# Patient Record
Sex: MALE | Race: WHITE | NOT HISPANIC OR LATINO | URBAN - METROPOLITAN AREA
[De-identification: names, ages, dates, MRNs, and addresses within clinical notes are randomized per-mention and may not be internally consistent; named-entity substitution may affect disease eponyms.]

---

## 2017-08-11 ENCOUNTER — OFFICE (OUTPATIENT)
Dept: URBAN - METROPOLITAN AREA CLINIC 101 | Facility: CLINIC | Age: 72
End: 2017-08-11

## 2017-08-11 VITALS
SYSTOLIC BLOOD PRESSURE: 125 MMHG | HEART RATE: 73 BPM | WEIGHT: 215 LBS | DIASTOLIC BLOOD PRESSURE: 81 MMHG | HEIGHT: 73 IN | TEMPERATURE: 97.5 F

## 2017-08-11 DIAGNOSIS — K59.09 OTHER CONSTIPATION: ICD-10-CM

## 2017-08-11 DIAGNOSIS — B34.9 VIRAL INFECTION, UNSPECIFIED: ICD-10-CM

## 2017-08-11 DIAGNOSIS — E10.9 TYPE 1 DIABETES MELLITUS WITHOUT COMPLICATIONS: ICD-10-CM

## 2017-08-11 DIAGNOSIS — R93.5 ABNORMAL FINDINGS ON DIAGNOSTIC IMAGING OF OTHER ABDOMINAL R: ICD-10-CM

## 2017-08-11 DIAGNOSIS — R11.2 NAUSEA WITH VOMITING, UNSPECIFIED: ICD-10-CM

## 2017-08-11 PROCEDURE — 99214 OFFICE O/P EST MOD 30 MIN: CPT

## 2022-03-26 ENCOUNTER — INPATIENT HOSPITAL (OUTPATIENT)
Dept: URBAN - METROPOLITAN AREA HOSPITAL 13 | Facility: HOSPITAL | Age: 77
End: 2022-03-26

## 2022-03-26 DIAGNOSIS — N28.89 OTHER SPECIFIED DISORDERS OF KIDNEY AND URETER: ICD-10-CM

## 2022-03-26 DIAGNOSIS — K31.84 GASTROPARESIS: ICD-10-CM

## 2022-03-26 DIAGNOSIS — R11.2 NAUSEA WITH VOMITING, UNSPECIFIED: ICD-10-CM

## 2022-03-26 DIAGNOSIS — R62.7 ADULT FAILURE TO THRIVE: ICD-10-CM

## 2022-03-26 PROCEDURE — 99222 1ST HOSP IP/OBS MODERATE 55: CPT | Performed by: INTERNAL MEDICINE

## 2022-03-27 PROCEDURE — 99232 SBSQ HOSP IP/OBS MODERATE 35: CPT | Performed by: INTERNAL MEDICINE

## 2022-03-28 ENCOUNTER — INPATIENT HOSPITAL (OUTPATIENT)
Dept: URBAN - METROPOLITAN AREA HOSPITAL 13 | Facility: HOSPITAL | Age: 77
End: 2022-03-28

## 2022-03-28 DIAGNOSIS — R11.2 NAUSEA WITH VOMITING, UNSPECIFIED: ICD-10-CM

## 2022-03-28 DIAGNOSIS — K29.60 OTHER GASTRITIS WITHOUT BLEEDING: ICD-10-CM

## 2022-03-28 DIAGNOSIS — K22.11 ULCER OF ESOPHAGUS WITH BLEEDING: ICD-10-CM

## 2022-03-28 DIAGNOSIS — K31.84 GASTROPARESIS: ICD-10-CM

## 2022-03-28 DIAGNOSIS — R62.7 ADULT FAILURE TO THRIVE: ICD-10-CM

## 2022-03-28 DIAGNOSIS — R13.19 OTHER DYSPHAGIA: ICD-10-CM

## 2022-03-28 PROCEDURE — 99232 SBSQ HOSP IP/OBS MODERATE 35: CPT | Performed by: NURSE PRACTITIONER

## 2022-03-29 ENCOUNTER — INPATIENT HOSPITAL (OUTPATIENT)
Dept: URBAN - METROPOLITAN AREA HOSPITAL 13 | Facility: HOSPITAL | Age: 77
End: 2022-03-29

## 2022-03-29 DIAGNOSIS — K29.60 OTHER GASTRITIS WITHOUT BLEEDING: ICD-10-CM

## 2022-03-29 DIAGNOSIS — R13.19 OTHER DYSPHAGIA: ICD-10-CM

## 2022-03-29 DIAGNOSIS — R62.7 ADULT FAILURE TO THRIVE: ICD-10-CM

## 2022-03-29 DIAGNOSIS — R11.2 NAUSEA WITH VOMITING, UNSPECIFIED: ICD-10-CM

## 2022-03-29 DIAGNOSIS — K22.11 ULCER OF ESOPHAGUS WITH BLEEDING: ICD-10-CM

## 2022-03-29 PROCEDURE — 43239 EGD BIOPSY SINGLE/MULTIPLE: CPT | Performed by: INTERNAL MEDICINE

## 2022-04-01 ENCOUNTER — OFFICE (OUTPATIENT)
Dept: URBAN - METROPOLITAN AREA CLINIC 79 | Facility: CLINIC | Age: 77
End: 2022-04-01

## 2022-04-01 VITALS
HEIGHT: 73 IN | WEIGHT: 230 LBS | SYSTOLIC BLOOD PRESSURE: 123 MMHG | TEMPERATURE: 98.1 F | HEART RATE: 75 BPM | DIASTOLIC BLOOD PRESSURE: 73 MMHG

## 2022-04-01 DIAGNOSIS — E11.9 TYPE 2 DIABETES MELLITUS WITHOUT COMPLICATIONS: ICD-10-CM

## 2022-04-01 DIAGNOSIS — Z85.01 PERSONAL HISTORY OF MALIGNANT NEOPLASM OF ESOPHAGUS: ICD-10-CM

## 2022-04-01 DIAGNOSIS — K20.90 ESOPHAGITIS, UNSPECIFIED WITHOUT BLEEDING: ICD-10-CM

## 2022-04-01 PROCEDURE — 99215 OFFICE O/P EST HI 40 MIN: CPT | Performed by: PHYSICIAN ASSISTANT

## 2022-04-01 RX ORDER — ESOMEPRAZOLE MAGNESIUM 40 MG/1
FOR SUSPENSION ORAL
Qty: 60 | Refills: 11 | Status: ACTIVE

## 2022-04-01 NOTE — SERVICEHPINOTES
76 yo male with h/o pancreatic and esophageal cancer (s/p esophagectomy with gastric pull up and pancreatic tail resection and splenectomy 2014 at [facility]) presents for f/u recent hospitalization for vomiting. He has been having bouts of vomiting intermittently, lasting up to 5 days since 2020 and had gone to the hospital recently due to prolonged symptoms. Started having N/V, belching, fatigue, no appetite on 3/8. Went the ER on 3/15 at [facility] and reports a CT showing no obstructions. He went to [facility] on 3/25 and was admitted through 3/29. He had a GES showing delayed emptying and had an EGD on 3/9 showing tortuous esophagus with severe erythematous, friable and nodular mucosa in the esophagus. Biopsies came back benign, though immunochemistry and special stains are still pending. He had previously been on pantoprazole 40 mg BID for the past year, and continues on this. Was given Carafate and liquid Prevacid to have on hand as well. Was on Reglan which was helping but this was DC'd. He has not been vomiting for the past 3 days. Had not had a BM for over a week but has had several now in the past couple of days. silas rosen He reports h/o type 2 diabetes but as he is insulin dependent he is now considered to have type 1. Reports last HgbA1C in the 7 range but says it has been awhile since he's had that checked. 
silas rosen He reports remote h/o TIAs for which he is on Plavix. Denies heart disease other than mild valve disease. Denies CAD.

## 2022-05-17 ENCOUNTER — ON CAMPUS - OUTPATIENT (OUTPATIENT)
Dept: URBAN - METROPOLITAN AREA HOSPITAL 65 | Facility: HOSPITAL | Age: 77
End: 2022-05-17

## 2022-05-17 DIAGNOSIS — K31.7 POLYP OF STOMACH AND DUODENUM: ICD-10-CM

## 2022-05-17 DIAGNOSIS — K20.80 OTHER ESOPHAGITIS WITHOUT BLEEDING: ICD-10-CM

## 2022-05-17 DIAGNOSIS — T18.2XXA FOREIGN BODY IN STOMACH, INITIAL ENCOUNTER: ICD-10-CM

## 2022-05-17 DIAGNOSIS — K29.60 OTHER GASTRITIS WITHOUT BLEEDING: ICD-10-CM

## 2022-05-17 PROCEDURE — 43239 EGD BIOPSY SINGLE/MULTIPLE: CPT | Performed by: INTERNAL MEDICINE

## 2022-08-31 ENCOUNTER — ON CAMPUS - OUTPATIENT (OUTPATIENT)
Dept: URBAN - METROPOLITAN AREA HOSPITAL 16 | Facility: HOSPITAL | Age: 77
End: 2022-08-31

## 2022-08-31 DIAGNOSIS — Z53.8 PROCEDURE AND TREATMENT NOT CARRIED OUT FOR OTHER REASONS: ICD-10-CM

## 2022-08-31 DIAGNOSIS — K20.80 OTHER ESOPHAGITIS WITHOUT BLEEDING: ICD-10-CM

## 2022-08-31 PROCEDURE — 43235 EGD DIAGNOSTIC BRUSH WASH: CPT | Mod: 53 | Performed by: INTERNAL MEDICINE

## 2022-09-20 ENCOUNTER — ON CAMPUS - OUTPATIENT (OUTPATIENT)
Dept: URBAN - METROPOLITAN AREA HOSPITAL 16 | Facility: HOSPITAL | Age: 77
End: 2022-09-20
Payer: COMMERCIAL

## 2022-09-20 DIAGNOSIS — R11.2 NAUSEA WITH VOMITING, UNSPECIFIED: ICD-10-CM

## 2022-09-20 DIAGNOSIS — K59.00 CONSTIPATION, UNSPECIFIED: ICD-10-CM

## 2022-09-20 DIAGNOSIS — K31.84 GASTROPARESIS: ICD-10-CM

## 2022-09-20 PROCEDURE — 99214 OFFICE O/P EST MOD 30 MIN: CPT | Performed by: INTERNAL MEDICINE

## 2022-09-21 ENCOUNTER — INPATIENT HOSPITAL (OUTPATIENT)
Dept: URBAN - METROPOLITAN AREA HOSPITAL 13 | Facility: HOSPITAL | Age: 77
End: 2022-09-21

## 2022-09-21 DIAGNOSIS — K59.00 CONSTIPATION, UNSPECIFIED: ICD-10-CM

## 2022-09-21 DIAGNOSIS — K31.84 GASTROPARESIS: ICD-10-CM

## 2022-09-21 DIAGNOSIS — R11.2 NAUSEA WITH VOMITING, UNSPECIFIED: ICD-10-CM

## 2022-09-21 PROCEDURE — 99213 OFFICE O/P EST LOW 20 MIN: CPT | Performed by: NURSE PRACTITIONER

## 2023-01-25 ENCOUNTER — OFFICE (OUTPATIENT)
Dept: URBAN - METROPOLITAN AREA CLINIC 79 | Facility: CLINIC | Age: 78
End: 2023-01-25

## 2023-01-25 VITALS
HEART RATE: 71 BPM | DIASTOLIC BLOOD PRESSURE: 81 MMHG | WEIGHT: 251 LBS | TEMPERATURE: 97.3 F | HEIGHT: 73 IN | SYSTOLIC BLOOD PRESSURE: 148 MMHG

## 2023-01-25 DIAGNOSIS — K31.84 GASTROPARESIS: ICD-10-CM

## 2023-01-25 DIAGNOSIS — E11.9 TYPE 2 DIABETES MELLITUS WITHOUT COMPLICATIONS: ICD-10-CM

## 2023-01-25 DIAGNOSIS — Z85.01 PERSONAL HISTORY OF MALIGNANT NEOPLASM OF ESOPHAGUS: ICD-10-CM

## 2023-01-25 DIAGNOSIS — K20.90 ESOPHAGITIS, UNSPECIFIED WITHOUT BLEEDING: ICD-10-CM

## 2023-01-25 PROCEDURE — 99214 OFFICE O/P EST MOD 30 MIN: CPT | Performed by: PHYSICIAN ASSISTANT

## 2023-01-25 NOTE — SERVICEHPINOTES
76 yo male with h/o pancreatic and esophageal cancer (s/p esophagectomy with gastric pull up and pancreatic tail resection and splenectomy 2014 at [facility]) presents for f/u esophagitis. 
silas rosen He was last seen in the office in April for f/u after a hospitalization for vomiting. He had a GES showing delayed emptying and had an EGD on 3/9 showing tortuous esophagus with severe erythematous, friable and nodular mucosa in the esophagus. He had previously been on pantoprazole 40 mg BID for the prior year. 
silas rosenWe switched him to Nexium granules which he has continued. He is on Nexium 40 mg BID. He tried to lower his dosage but had too much reflux. He says his swallowing is normally ok but he is cautious with his diet. He has been taking Reglan 4x/day as he finds this to be helpful. silas rosen He had f/u EGDs in May and August 2022 with improvements each time in his esophagus, but retained food in stomach with recommendation for another EGD after a longer period of liquids/fasting. 
silas rosen He is doing ok overall today. Had f/u with oncologist in December. CT showing some enlarged B/L inguinal lymph nodes and he is going to see his PCP in the near future.  He has soft stools 1-3x/day. He is on Creon which feels helpful for him. Last colonoscopy was in 2015 but was difficult due to tortuosity. No polyps, and due to difficulty, no further procedure indicated unless needed. He reports h/o type 2 diabetes but as he is insulin dependent he is now considered to have type 1. Reports last HgbA1C in the 7 range.He reports remote h/o TIAs for which he is on Plavix. Denies heart disease other than mild valve disease. Denies CAD.

## 2023-03-08 ENCOUNTER — ON CAMPUS - OUTPATIENT (OUTPATIENT)
Dept: URBAN - METROPOLITAN AREA HOSPITAL 65 | Facility: HOSPITAL | Age: 78
End: 2023-03-08

## 2023-03-08 DIAGNOSIS — R13.10 DYSPHAGIA, UNSPECIFIED: ICD-10-CM

## 2023-03-08 DIAGNOSIS — R11.2 NAUSEA WITH VOMITING, UNSPECIFIED: ICD-10-CM

## 2023-03-08 DIAGNOSIS — K22.81 ESOPHAGEAL POLYP: ICD-10-CM

## 2023-03-08 DIAGNOSIS — K29.60 OTHER GASTRITIS WITHOUT BLEEDING: ICD-10-CM

## 2023-03-08 DIAGNOSIS — K31.7 POLYP OF STOMACH AND DUODENUM: ICD-10-CM

## 2023-03-08 PROCEDURE — 43239 EGD BIOPSY SINGLE/MULTIPLE: CPT | Performed by: INTERNAL MEDICINE

## 2023-04-19 ENCOUNTER — OFFICE (OUTPATIENT)
Dept: URBAN - METROPOLITAN AREA CLINIC 79 | Facility: CLINIC | Age: 78
End: 2023-04-19

## 2023-04-19 VITALS
HEART RATE: 78 BPM | SYSTOLIC BLOOD PRESSURE: 138 MMHG | TEMPERATURE: 98.2 F | DIASTOLIC BLOOD PRESSURE: 72 MMHG | WEIGHT: 245 LBS | HEIGHT: 73 IN

## 2023-04-19 DIAGNOSIS — E11.9 TYPE 2 DIABETES MELLITUS WITHOUT COMPLICATIONS: ICD-10-CM

## 2023-04-19 DIAGNOSIS — K20.90 ESOPHAGITIS, UNSPECIFIED WITHOUT BLEEDING: ICD-10-CM

## 2023-04-19 DIAGNOSIS — Z85.01 PERSONAL HISTORY OF MALIGNANT NEOPLASM OF ESOPHAGUS: ICD-10-CM

## 2023-04-19 DIAGNOSIS — K31.84 GASTROPARESIS: ICD-10-CM

## 2023-04-19 PROCEDURE — 99214 OFFICE O/P EST MOD 30 MIN: CPT | Performed by: PHYSICIAN ASSISTANT

## 2023-04-19 NOTE — SERVICEHPINOTES
79 yo male with h/o pancreatic and esophageal cancer (s/p esophagectomy with gastric pull up and pancreatic tail resection and splenectomy 2014 at [facility]) presents for f/u esophagitis. He still follows with Dr. Collier and had a telemed visit with her this week. silas rosen He had an updated EGD in early March as a f/u to esophagitis and retained food in stomach on prior EGDs last year. He had nodular mucosa and appearance of ulcer in the lower esophageal area and hyperplastic stomach polyps. Path showed benign findings overall, and he has repeat EGD scheduled for late May. 
silas rosen He does note that he gets a bout of nausea, fatigue, and sometimes vomiting every few months, and this had happened again (including vomiting) just prior to his recent EGD, which helped his stomach to be emptied this time. He notes that he had been constipated for 6 days prior to his N/V episode. He is being very careful now to avoid constipation. He is using MIralax/suppositories PRN to maintain a daily BM.
silas rosen He had an UGI in late March showing s/p partial esophagectomy luminal narrowing at anastomotic level with delay in clearance of barium He does get solid food dysphagia at times and reports prior dilations in past.
silas rosen
He continues on Nexium granules, 40 mg BID can still have reflux at times and will use Tums. He has gastroparesis and has reduced his Reglan dosage to 1/2 pill and down to BID instead of 4x/day. Seems to be working pretty well. silas rosen He denies NSAID use. He reports h/o type 2 diabetes but as he is insulin dependent he is now considered to have type 1. Reports last HgbA1C in the 7 range.He reports remote h/o TIAs for which he is on Plavix. Denies heart disease other than mild valve disease. Denies CAD.
silas  Detail Level: Detailed

## 2023-08-31 ENCOUNTER — ON CAMPUS - OUTPATIENT (OUTPATIENT)
Dept: URBAN - METROPOLITAN AREA HOSPITAL 16 | Facility: HOSPITAL | Age: 78
End: 2023-08-31

## 2023-08-31 DIAGNOSIS — K22.10 ULCER OF ESOPHAGUS WITHOUT BLEEDING: ICD-10-CM

## 2023-08-31 DIAGNOSIS — Z85.01 PERSONAL HISTORY OF MALIGNANT NEOPLASM OF ESOPHAGUS: ICD-10-CM

## 2023-08-31 DIAGNOSIS — K29.50 UNSPECIFIED CHRONIC GASTRITIS WITHOUT BLEEDING: ICD-10-CM

## 2023-08-31 DIAGNOSIS — B37.81 CANDIDAL ESOPHAGITIS: ICD-10-CM

## 2023-08-31 PROCEDURE — 43239 EGD BIOPSY SINGLE/MULTIPLE: CPT | Performed by: INTERNAL MEDICINE

## 2023-12-15 ENCOUNTER — OFFICE (OUTPATIENT)
Dept: URBAN - METROPOLITAN AREA CLINIC 34 | Facility: CLINIC | Age: 78
End: 2023-12-15
Payer: COMMERCIAL

## 2023-12-15 DIAGNOSIS — K20.90 ESOPHAGITIS, UNSPECIFIED WITHOUT BLEEDING: ICD-10-CM

## 2023-12-15 DIAGNOSIS — Z85.01 PERSONAL HISTORY OF MALIGNANT NEOPLASM OF ESOPHAGUS: ICD-10-CM

## 2023-12-15 DIAGNOSIS — K31.84 GASTROPARESIS: ICD-10-CM

## 2023-12-15 PROCEDURE — 99214 OFFICE O/P EST MOD 30 MIN: CPT | Performed by: PHYSICIAN ASSISTANT
